# Patient Record
Sex: FEMALE | URBAN - METROPOLITAN AREA
[De-identification: names, ages, dates, MRNs, and addresses within clinical notes are randomized per-mention and may not be internally consistent; named-entity substitution may affect disease eponyms.]

---

## 2017-10-17 NOTE — PROCEDURE NOTE: CLINICAL
PROCEDURE NOTE: Lucentis 0.5mg PFS OS. Diagnosis: Neovascular AMD with Active CNV. Anesthesia: Akten Gel 3.5%. Prep: Betadine Flush. Prior to injection, risks/benefits/alternatives discussed including but not limited to infection, loss of vision or eye, hemorrhage, cataract, glaucoma, retinal tears or detachment. The patient wished to proceed with treatment. Topical anesthesia was induced with Alcaine. Additional anesthesia was achieved using drop(s) or injection checked above. A drop of Povidone-iodine 5% ophthalmic solution was instilled over the injection site and in the inferior fornix. Betadine prep was performed. A single use prefilled syringe of intravitreal Lucentis 0.5mg/0.05ml was used and excess discarded. The needle was passed 3.0 mm posterior to the limbus in pseudophakic patients, and 3.5 mm posterior to the limbus in phakic patients. The remainder of the Lucentis 0.5mg in the single-use vial was then discarded in a medical waste disposal container. The eye was irrigated with sterile irrigating solution. Patient tolerated the procedure well. There were no complications. Post procedure instructions given. CF vision checked. Injection Time 6:10. The patient was instructed to return for re-evaluation in approximately 4-12 weeks depending on his/her condition and was told to call immediately if vision decreases and/or if his/her eye becomes red, painful, and/or light sensitive. The patient was instructed to go to the emergency room or call 911 if unable to reach the doctor within an hour or two of trying or calling. Luul Soni

## 2017-10-17 NOTE — PATIENT DISCUSSION
Reviewed OPTIONS including AVASTIN/EYLEA/LUCENTIS and patient wants to proceed with LUCENTIS treatment AND REPEAT EVERY 5 WKS X 2.

## 2019-06-11 NOTE — PATIENT DISCUSSION
- Hasn't tried CTLs in 15 years.  Had allergy issues in the past but doesn't think she ever wore dailies

## 2019-06-13 NOTE — PATIENT DISCUSSION
RETINA IS ATTACHED OU: PVD OU; LATTICE DEGENERATION OS; INFERIOR AND SUPERIOR RETINAL TEARS OS; NO HOLES OR TEARS SEEN ON DILATED EXAM OD.  RETINAL DETACHMENT SIGNS AND SYMPTOMS REVIEWED

## 2019-06-13 NOTE — PATIENT DISCUSSION
Post-laser (DARIN) Counseling:  Treatment of retinal breaks with laser greatly reduces but does not completely eliminate the risk of retinal detachment. Keep any scheduled appointments for re-evaluation of the retina, and report any increase in flashing lights or floaters.

## 2019-06-27 NOTE — PATIENT DISCUSSION
POST-OP WEEK 2 EXAM: S/P LASER RETINOPEXY OS; Doing well today. Retina attached. IOP within acceptable limits. Retinal detachment and endophthalmitis precautions reviewed. Instructed to call immediately for worsening vision, eye pain, or eye discharge.

## 2019-09-12 NOTE — PATIENT DISCUSSION
RETINA IS ATTACHED OS: S/P LASER RETINOPEXY OS; PVD OS; LATTICE DEGENERATION OS; NO HOLES OR TEARS SEEN ON DILATED EXAM TODAY.  RETINAL DETACHMENT SIGNS AND SYMPTOMS REVIEWED

## 2020-03-19 NOTE — PATIENT DISCUSSION
Dry Eye Syndrome Counseling: I have discussed the diagnosis and the pathophysiology of this disease with the patient. Vision may be limited by dry eye, and symptoms exacerbated by environmental factors such as smoke, wind, or prolonged eye use. I stressed the importance of compliance with treatment.

## 2020-03-19 NOTE — PATIENT DISCUSSION
RETINA IS ATTACHED OU: PVD OU; S/P LASER RETINOPEXY OS; LATTICE DEGENERATION OS; NO NEW HOLES OR TEARS SEEN ON DILATED EXAM TODAY.  RETINAL DETACHMENT SIGNS AND SYMPTOMS REVIEWED

## 2021-03-22 ENCOUNTER — IMPORTED ENCOUNTER (OUTPATIENT)
Dept: URBAN - METROPOLITAN AREA CLINIC 50 | Facility: CLINIC | Age: 36
End: 2021-03-22

## 2022-11-04 ENCOUNTER — PREPPED CHART (OUTPATIENT)
Dept: URBAN - METROPOLITAN AREA CLINIC 52 | Facility: CLINIC | Age: 37
End: 2022-11-04

## 2022-12-12 ENCOUNTER — NEW PATIENT (OUTPATIENT)
Dept: URBAN - METROPOLITAN AREA CLINIC 52 | Facility: CLINIC | Age: 37
End: 2022-12-12

## 2022-12-12 PROCEDURE — 92004 COMPRE OPH EXAM NEW PT 1/>: CPT

## 2022-12-12 PROCEDURE — 92015 DETERMINE REFRACTIVE STATE: CPT

## 2022-12-12 ASSESSMENT — KERATOMETRY
OD_AXISANGLE_DEGREES: 154
OS_AXISANGLE_DEGREES: 61
OS_K1POWER_DIOPTERS: 45.00
OD_AXISANGLE2_DEGREES: 64
OS_K2POWER_DIOPTERS: 45.75
OD_K1POWER_DIOPTERS: 44.75
OS_AXISANGLE2_DEGREES: 151
OD_K2POWER_DIOPTERS: 45.25

## 2022-12-12 ASSESSMENT — VISUAL ACUITY
OD_PH: 20/25-2
OS_CC: 20/20
OS_SC: 20/60-1
OD_CC: 20/25-2
OD_SC: 20/50
OU_SC: J1+

## 2022-12-12 ASSESSMENT — TONOMETRY
OS_IOP_MMHG: 15
OD_IOP_MMHG: 15

## 2024-04-10 ENCOUNTER — NEW PATIENT (OUTPATIENT)
Dept: URBAN - METROPOLITAN AREA CLINIC 49 | Facility: LOCATION | Age: 39
End: 2024-04-10

## 2024-04-10 DIAGNOSIS — H04.123: ICD-10-CM

## 2024-04-10 DIAGNOSIS — H02.413: ICD-10-CM

## 2024-04-10 DIAGNOSIS — H35.033: ICD-10-CM

## 2024-04-10 DIAGNOSIS — H52.4: ICD-10-CM

## 2024-04-10 DIAGNOSIS — E11.9: ICD-10-CM

## 2024-04-10 DIAGNOSIS — H52.13: ICD-10-CM

## 2024-04-10 PROCEDURE — 99214 OFFICE O/P EST MOD 30 MIN: CPT

## 2024-04-10 ASSESSMENT — KERATOMETRY
OD_K1POWER_DIOPTERS: 44.75
OS_K1POWER_DIOPTERS: 45.00
OD_AXISANGLE_DEGREES: 154
OS_AXISANGLE2_DEGREES: 151
OS_K2POWER_DIOPTERS: 45.75
OD_AXISANGLE2_DEGREES: 64
OS_AXISANGLE_DEGREES: 61
OD_K2POWER_DIOPTERS: 45.25

## 2024-04-10 ASSESSMENT — VISUAL ACUITY
OS_CC: 20/20
OD_CC: 20/20-2
OU_SC: J1+

## 2024-04-10 ASSESSMENT — TONOMETRY
OD_IOP_MMHG: 18
OS_IOP_MMHG: 18

## 2025-04-16 ENCOUNTER — COMPREHENSIVE EXAM (OUTPATIENT)
Age: 40
End: 2025-04-16

## 2025-04-16 DIAGNOSIS — H02.413: ICD-10-CM

## 2025-04-16 DIAGNOSIS — E11.9: ICD-10-CM

## 2025-04-16 DIAGNOSIS — H04.123: ICD-10-CM

## 2025-04-16 DIAGNOSIS — H35.033: ICD-10-CM

## 2025-04-16 DIAGNOSIS — D31.32: ICD-10-CM

## 2025-04-16 PROCEDURE — 99214 OFFICE O/P EST MOD 30 MIN: CPT
